# Patient Record
Sex: FEMALE | Race: BLACK OR AFRICAN AMERICAN | NOT HISPANIC OR LATINO | Employment: UNEMPLOYED | URBAN - METROPOLITAN AREA
[De-identification: names, ages, dates, MRNs, and addresses within clinical notes are randomized per-mention and may not be internally consistent; named-entity substitution may affect disease eponyms.]

---
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
## 2022-04-30 NOTE — OP NOTE
Patient:   Beckie Chow             MRN: [id number]            FIN: [id number]               Age:   19 years     Sex:  Female     :  2000   Associated Diagnoses:   None   Author:   Rey WATSON, Seven Willoughby      Operative Note   Operative Information   Procedures Performed: Bilateral femoral vein ultrasound guided access with 8fr sheath on the right and 10fr sheath on the left, Bilateral iliac and IVC venograms, IVUS to IVC and right and left iliac veins, PTA to left CIV with 16x60 balloon, placement of 18x90 stent with it's central extent in the IVC and it's peripheral extent in the left CIV.     Indications: Severe leg discomfort and swelling.     Preoperative Diagnosis: central venous stenosis.     Postoperative Diagnosis: same.     Surgeon: Seven Le MD.     Anesthesia: moderate sedation.     Esimated blood loss: No blood loss.     Description of Procedure/Findings/    Complications: Pt was taken to the cath lab and placed on the table in supine position.  The patient was prepped and draped in the usual sterile fashion and timeout was performed.  Ultrasound was used to evaluate the bilateral femoral veins which were found to be very small.  Access was obtained of the right and then the left femoral veins under ultrasound guidance and 8fr sheaths were placed on both sides.  Venogram of the right and then the left iliac systems were obtianed.  There were no significant stenoses identified on the venograms.  The external and common iliac veins bilaterally and the IVC were without radiographic evidence of stenosis.  We then passed an IVUS catheter into the the left sheath and recorded a pullback from the IVC to the left external iliac vein with measurements in the findings area.  We then passed the IVUS catheter into the right sheath and reocrded a pullback from the IVC to the right external iliac vein with measurements in the findings area.  I dilated the stricture in the left CIV with a 16x60  balloono and did not note an area of stricture on that dilation.  The IVUS again confirmed the stenosis in the left CIV.  An 18x90 stent was placed with it's central extent in the IVC below the renal veins and it's peripheral extent in the left CIV beyond the area of stenosis.  This was post dilated with the 16x60 balloon.  There was good wall apposition in the cava, but the mid CIV was dilated to 21mm and the distal portion of the stent did not appear to have good apposition.  A repeat venogram was performed from each side showing good flow bilaterally.  The wires were removed and the sheaths were pulled with pressure being held for 10 minutes on each side achieving excellent hemostasis.  The patient was returned to holding in stable condition..     Findings: No evidence of stenosis on initial venogram of the left or right iliac systems or the IVC (from the AP framing).  A stenosis is identified in the mid IVC on 45 degrees Serbian, though this appeared to be less than 50% and was not identified on IVUS.  The reference area of the right common iliac vein was 107 and the reference area of the right external iliac vein was 93.  On the left, the reference area of the common iliac vein was 173 and the compressed portion was 39mm.  An 18x90 stent was deployed and post dilated with a 16x60 balloon resulting in an area of 165 (a difference of 77%).eference area of the left external iliac vein was 90mm..     Complications: None.     Notes: The patient has very small vessels.  I discussed with the patient's family and the patient that I only placed the single stent because of the patient's age.  It is possible that further stenting will be required in the future..